# Patient Record
Sex: MALE | Race: WHITE | Employment: OTHER | URBAN - METROPOLITAN AREA
[De-identification: names, ages, dates, MRNs, and addresses within clinical notes are randomized per-mention and may not be internally consistent; named-entity substitution may affect disease eponyms.]

---

## 2022-09-23 ENCOUNTER — HOSPITAL ENCOUNTER (OUTPATIENT)
Dept: PHYSICAL THERAPY | Age: 57
Discharge: HOME | End: 2022-09-23
Payer: OTHER GOVERNMENT

## 2022-09-23 PROCEDURE — 97535 SELF CARE MNGMENT TRAINING: CPT | Performed by: PHYSICAL THERAPIST

## 2022-09-23 PROCEDURE — 97112 NEUROMUSCULAR REEDUCATION: CPT | Performed by: PHYSICAL THERAPIST

## 2022-09-23 PROCEDURE — 97110 THERAPEUTIC EXERCISES: CPT | Performed by: PHYSICAL THERAPIST

## 2022-09-23 PROCEDURE — 97140 MANUAL THERAPY 1/> REGIONS: CPT | Performed by: PHYSICAL THERAPIST

## 2022-09-23 PROCEDURE — 97530 THERAPEUTIC ACTIVITIES: CPT | Performed by: PHYSICAL THERAPIST

## 2022-09-23 PROCEDURE — 97162 PT EVAL MOD COMPLEX 30 MIN: CPT | Performed by: PHYSICAL THERAPIST

## 2022-09-23 NOTE — PROGRESS NOTES
PT DAILY TREATMENT NOTE     Patient Name: Sissy Tinajero  Date:2022  : 1965  [x]  Patient  Verified  Payor: MICHAEL / Plan: Kp [address] / Product Type:  /    In time:8:21A  Out time:9:10A  Total Treatment Time (min): 49min  Visit #: 1 of 18    Treatment Area: Other low back pain [M54.59]  Sacroiliitis (HCC) [M46.1]    SUBJECTIVE  Pain Level (0-10 scale): 1/10  Any medication changes, allergies to medications, adverse drug reactions, diagnosis change, or new procedure performed?: [x] No    [] Yes (see summary sheet for update)  Subjective functional status/changes:   [] No changes reported    Aggravating factors: sitting down/standing up, and bending over and standing up, walking more than 3/10ths of a mile. OBJECTIVE    15 min [x]Eval                  []Re-Eval       14 min Therapeutic Exercise:  [x] See flow sheet :   Rationale: increase ROM and increase strength to improve the patients ability to Tolerate basic ADLs and job-related tasks without pain. 12 min Neuromuscular Re-education:  [x]  See flow sheet :   Rationale: improve coordination, improve balance, and increase proprioception  to improve the patients ability to perform activities with good form and proprioception with tactile and verbal cuing appropriately. 8 min Manual Therapy:  grade 2-3 prone P/As to lumbar spine to S1 to L1   The manual therapy interventions were performed at a separate and distinct time from the therapeutic activities interventions. Rationale: decrease pain, increase ROM, and increase tissue extensibility to improve overall activity tolerance.     10 min Self Care/Home Management: Discussion of HEP, activity modification    Rationale: increase ROM and increase strength  to improve the patients ability to improve function in the home and at work          With   [x] TE   [x] TA   [x] neuro   [] other: Patient Education: [x] Review HEP    [x] Progressed/Changed HEP based on:   [x] positioning   [] body mechanics   [] transfers   [] heat/ice application    [] other:      Other Objective/Functional Measures:     Sit to Stands in 30 secs: 13 reps     SLR: - bilaterally   Slump Test: + on the Left    A/ROM  Hip flexion left to 85 degrees, Right to 95 degrees    Gaenslens Test: - bilaterally    Reflexes WNL bilaterally    MMT: 4-/5 throughout with positive quad lag    Repeated extension in prone: abolished pain  Repeated extension in standing: increased pain  Repeated flexion: peripheralized pain     Pain Level (0-10 scale) post treatment: 2/10    ASSESSMENT/Changes in Function: Patient is a pleasant older adult male with sub-acute onset of right hip/low back pain of insidious onset in the presence of chronic lack of exercise. 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 Treatment will include graded exposure to activity as well as pain science education. Patient will continue to benefit from skilled PT services to modify and progress therapeutic interventions, address functional mobility deficits, address ROM deficits, address strength deficits, analyze and address soft tissue restrictions, analyze and cue movement patterns, analyze and modify body mechanics/ergonomics, assess and modify postural abnormalities, address imbalance/dizziness, and instruct in home and community integration to attain remaining goals. [x]  See Plan of Care  []  See progress note/recertification  []  See Discharge Summary         Progress towards goals / Updated goals:  See POC    PLAN  [x]  Upgrade activities as tolerated     [x]  Continue plan of care  []  Update interventions per flow sheet       []  Discharge due to:_  []  Other:_      Nay Rubio, PT 9/23/2022  8:32 AM    No future appointments.

## 2022-09-23 NOTE — PROGRESS NOTES
[facility]  [address] [facility], [address] [place] [address], [address] [place] [address],[address]  [phone] fax      Plan of Care/ Statement of Necessity for Physical Therapy Services    Patient name: Jh Yost Start of Care: 2022   Referral source: Britney Ordaz NP : 1965    Medical Diagnosis: Other low back pain [M54.59]  Sacroiliitis ([address] [M46.1]  Payor:  / Plan: Fernando Anshu / Product Type:  /  Onset Date:22    Treatment Diagnosis: Left low back, hip and knee pain   Prior Hospitalization: see medical history Provider#: [id number]   Medications: Verified on Patient summary List    Comorbidities: Patient reports:    Prior Level of Function: I ADLs and 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 The Plan of Care and following information is based on the information from the initial evaluation. Assessment/ key information: Patient is a pleasant older adult male with sub-acute onset of right hip/low back pain of insidious onset in the presence of chronic lack of exercise. 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 Treatment will include graded exposure to activity as well as pain science education.    Evaluation Complexity History MEDIUM  Complexity : 1-2 comorbidities / personal factors will impact the outcome/ POC ; Examination MEDIUM Complexity : 3 Standardized tests and measures addressing body structure, function, activity limitation and / or participation in recreation  ;Presentation LOW Complexity : Stable, uncomplicated  ;Clinical Decision Making MEDIUM Complexity : FOTO score of 26-74  Overall Complexity Rating: MEDIUM  Problem List: pain affecting function, decrease ROM, decrease strength, edema affecting function, impaired gait/ balance, decrease ADL/ functional abilitiies, decrease activity tolerance, decrease flexibility/ joint mobility, and decrease transfer abilities   Treatment Plan may include any combination of the following: Therapeutic exercise, Therapeutic activities, Neuromuscular re-education, Physical agent/modality, Gait/balance training, Manual therapy, Patient education, Self Care training, Functional mobility training, Home safety training, Stair training, and Other: HEP  Patient / Family readiness to learn indicated by: asking questions, trying to perform skills, and interest  Persons(s) to be included in education: patient (P)  Barriers to Learning/Limitations: None  Patient Goal (s): I'd like to be able to walk better and get up and down a bit easier.   Patient Self Reported Health Status: good  Rehabilitation Potential: good    Short Term Goals: To be accomplished in 4 weeks:  1. Patient will be I with HEP for carryover to home management               Eval: established  2. Patient will be able to bend lift and carry 20# with good body mechanics and little to no difficulty to facilitate grocery management. Eval: 40# causes pain and is difficult once/week  Long Term Goals: To be accomplished in 6 weeks:  1. Patient will achieve predicted FOTO score of 68 for demonstration of improved function. Eval: 55  2. Patient will report ability to walk more than a mile with little to no difficulty to facilitate return to community recreation. Eval: 3/10ths of a mile  3. Patient will be able to bend lift and carry 50# with good body mechanics and little to no difficulty to facilitate grocery management. Eval: 40# causes pain and is difficult once/week    Frequency / Duration: Patient to be seen 2-3 times per week for 6 weeks.     Patient/ Caregiver education and instruction: Diagnosis, prognosis, self care, activity modification, and exercises   [x]  Plan of care has been reviewed with WOO Grant, PT 9/23/2022 8:33 AM  ________________________________________________________________________    I certify that the above Therapy Services are being furnished while the patient is under my care. I agree with the treatment plan and certify that this therapy is necessary.     [address] [place] [address] Signature:____________Date:_________TIME:________     Avani Melara NP  ** Signature, Date and Time must be completed for valid certification **      Please sign and return to In [address] [place] [address] [address]  [address] [place] [address], [address] [place] [address], [address] [facility] [address]  [phone] fax

## 2022-09-30 ENCOUNTER — HOSPITAL ENCOUNTER (OUTPATIENT)
Dept: PHYSICAL THERAPY | Age: 57
Discharge: HOME | End: 2022-09-30
Payer: OTHER GOVERNMENT

## 2022-09-30 PROCEDURE — 97110 THERAPEUTIC EXERCISES: CPT

## 2022-09-30 PROCEDURE — 97112 NEUROMUSCULAR REEDUCATION: CPT

## 2022-09-30 PROCEDURE — 97140 MANUAL THERAPY 1/> REGIONS: CPT

## 2022-09-30 PROCEDURE — 97530 THERAPEUTIC ACTIVITIES: CPT

## 2022-09-30 NOTE — PROGRESS NOTES
PT DAILY TREATMENT NOTE     Patient Name: Yvette Rae  Date:2022  : 1965  [x]  Patient  Verified  Payor: MICHAEL / Plan: Kp [address] / Product Type:  /    In time:9:47  Out time:10:48  Total Treatment Time (min): 61  Visit #: 2 of     Medicare/BCBS Only   Total Timed Codes (min):   1:1 Treatment Time:         Treatment Area: Other low back pain [M54.59]  Sacroiliitis (HCC) [M46.1]    SUBJECTIVE  Pain Level (0-10 scale): 2  Any medication changes, allergies to medications, adverse drug reactions, diagnosis change, or new procedure performed?: [x] No    [] Yes (see summary sheet for update)  Subjective functional status/changes:   [] No changes reported  \"The pain is on the left side of my back. \"    OBJECTIVE    Modality rationale: decrease pain and increase tissue extensibility to improve the patients ability to perform ADL    Min Type Additional Details    [] Estim:  []Unatt       []IFC  []Premod                        []Other:  []w/ice   []w/heat  Position:  Location:    [] Estim: []Att    []TENS instruct  []NMES                    []Other:  []w/US   []w/ice   []w/heat  Position:  Location:    []  Traction: [] Cervical       []Lumbar                       [] Prone          []Supine                       []Intermittent   []Continuous Lbs:  [] before manual  [] after manual    []  Ultrasound: []Continuous   [] Pulsed                           []1MHz   []3MHz W/cm2:  Location:    []  Iontophoresis with dexamethasone         Location: [] Take home patch   [] In clinic   10 [x]  Ice     []  heat  []  Ice massage  []  Laser   []  Anodyne Position:long sit  Location:(B) LSP    []  Laser with stim  []  Other:  Position:  Location:    []  Vasopneumatic Device    []  Right     []  Left  Pre-treatment girth:  Post-treatment girth:  Measured at (location):  Pressure:       [] lo [] med [] hi   Temperature: [] lo [] med [] hi   [x] Skin assessment post-treatment:  [x]intact []redness- no adverse reaction    []redness - adverse reaction:      min []Eval                  []Re-Eval       20 min Therapeutic Exercise:  [x] See flow sheet :   Rationale: increase ROM and increase strength to improve the patients ability to perform dailly chores    12 min Therapeutic Activity:  [x]  See flow sheet :   Rationale: increase ROM, increase strength, and improve coordination  to improve the patients ability to perform  sit to stand with no difficulty     8 min Neuromuscular Re-education:  [x]  See flow sheet :   Rationale: increase ROM, increase strength, improve coordination, improve balance, and increase proprioception  to improve the patients ability to     11 min Manual Therapy:  checked alignment:  right innominate posterior rotated P/A mob TSP /LSP passive  stretch (B) piriformis prone   The manual therapy interventions were performed at a separate and distinct time from the therapeutic activities interventions. Rationale: decrease pain, increase ROM, increase tissue extensibility, decrease trigger points, and increase postural awareness to perform household      min Gait Training:  ___ feet with ___ device on level surfaces with ___ level of assist   Rationale:     min Self Care/Home Management:    Rationale: increase ROM, increase strength, and improve coordination  to improve the patients ability to perform household chores          With   [x] TE   [x] TA   [] neuro   [] other: Patient Education: [x] Review HEP    [] Progressed/Changed HEP based on:   [] positioning   [] body mechanics   [] transfers   [] heat/ice application    [] other:      Other Objective/Functional Measures:  Right innominate posterior rotated     Pain Level (0-10 scale) post treatment: 0    ASSESSMENT/Changes in Function: Pt responded well to each supine and prone strengthening there ex with cues for correct form.  Pt presents with tightness at (B) LSP during manual. Therapist discussed with pt on importance of moving around during the day and to perform HEP to promote flexibility and to decrease pain at LSP. Pt verbally understood. Pt  benefited with treatment due to no pain. Pt is progressing towards all     Patient will continue to benefit from skilled PT services to modify and progress therapeutic interventions, address functional mobility deficits, address ROM deficits, address strength deficits, analyze and address soft tissue restrictions, analyze and cue movement patterns, analyze and modify body mechanics/ergonomics, assess and modify postural abnormalities, and instruct in home and community integration to attain remaining goals. [x]  See Plan of Care  []  See progress note/recertification  []  See Discharge Summary         Progress towards goals / Updated goals:  1. Patient will be I with HEP for carryover to home management               Eval: established               Current: 1x day   9/30/22  2. Patient will be able to bend lift and carry 20# with good body mechanics and little to no difficulty to facilitate grocery management. Eval: 40# causes pain and is difficult once/week  Long Term Goals: To be accomplished in 6 weeks:  1. Patient will achieve predicted FOTO score of 68 for demonstration of improved function. Eval: 55  2. Patient will report ability to walk more than a mile with little to no difficulty to facilitate return to community recreation. Eval: 3/10ths of a mile  3. Patient will be able to bend lift and carry 50# with good body mechanics and little to no difficulty to facilitate grocery management.                Eval: 40# causes pain and is difficult once/week    PLAN  []  Upgrade activities as tolerated     [x]  Continue plan of care  []  Update interventions per flow sheet       []  Discharge due to:_  []  Other:_      Mera Spaulding, WOO 9/30/2022  9:58 AM    Future Appointments   Date Time Provider Alisia Benítez   10/3/2022  9:45 AM Zack Rubio, PT [facility]   10/7/2022  9:45 AM Nirmal Shetty, PTA [facility]   10/10/2022 12:00 PM Nirmal Shetty, PTA [facility]   10/14/2022  1:30 PM Zoraida Harry, PT [facility]   10/17/2022  9:00 AM Nirmal Shetty, PTA [facility]   10/21/2022  9:00 AM Nirmal Shetty, PTA [facility]   10/24/2022  9:00 AM Nirmal Shetty, PTA [facility]   10/28/2022  9:00 AM Nirmal Shetty, PTA [facility]   10/31/2022  9:00 AM Nirmal Shetty, PTA [facility]

## 2022-10-03 ENCOUNTER — APPOINTMENT (OUTPATIENT)
Dept: PHYSICAL THERAPY | Age: 57
End: 2022-10-03
Payer: OTHER GOVERNMENT

## 2022-10-07 ENCOUNTER — HOSPITAL ENCOUNTER (OUTPATIENT)
Dept: PHYSICAL THERAPY | Age: 57
Discharge: HOME | End: 2022-10-07
Payer: OTHER GOVERNMENT

## 2022-10-07 PROCEDURE — 97140 MANUAL THERAPY 1/> REGIONS: CPT

## 2022-10-07 PROCEDURE — 97110 THERAPEUTIC EXERCISES: CPT

## 2022-10-07 PROCEDURE — 97535 SELF CARE MNGMENT TRAINING: CPT

## 2022-10-07 NOTE — PROGRESS NOTES
PT DAILY TREATMENT NOTE     Patient Name: Julia Arechiga  Date:10/7/2022  : 1965  [x]  Patient  Verified  Payor:  / Plan: Kp [address] / Product Type:  /    In time:948 am  Out time:1033 am  Total Treatment Time (min): 45  Visit #: 3 of 18    Treatment Area: Other low back pain [M54.59]  Sacroiliitis (HCC) [M46.1]    SUBJECTIVE  Pain Level (0-10 scale): 0/10   Any medication changes, allergies to medications, adverse drug reactions, diagnosis change, or new procedure performed?: [x] No    [] Yes (see summary sheet for update)  Subjective functional status/changes:   [] No changes reported  Patient reports having some pin point soreness on the left side of his low back. OBJECTIVE    Modality rationale: Patient declined modalities today.      Min Type Additional Details    [] Estim:  []Unatt       []IFC  []Premod                        []Other:  []w/ice   []w/heat  Position:  Location:    [] Estim: []Att    []TENS instruct  []NMES                    []Other:  []w/US   []w/ice   []w/heat  Position:  Location:    []  Traction: [] Cervical       []Lumbar                       [] Prone          []Supine                       []Intermittent   []Continuous Lbs:  [] before manual  [] after manual    []  Ultrasound: []Continuous   [] Pulsed                           []1MHz   []3MHz W/cm2:  Location:    []  Iontophoresis with dexamethasone         Location: [] Take home patch   [] In clinic    []  Ice     []  heat  []  Ice massage  []  Laser   []  Anodyne Position:  Location:    []  Laser with stim  []  Other:  Position:  Location:    []  Vasopneumatic Device    []  Right     []  Left  Pre-treatment girth:  Post-treatment girth:  Measured at (location):  Pressure:       [] lo [] med [] hi   Temperature: [] lo [] med [] hi   [] Skin assessment post-treatment:  []intact []redness- no adverse reaction []redness - adverse reaction:     22 min Therapeutic Exercise:  [] See flow sheet : Rationale: increase ROM and increase strength to improve the patients overall muscle endurance and activity tolerance for ADL's and functional tasks. min Therapeutic Activity:  []  See flow sheet :   Rationale: increase strength and improve coordination  to improve the patients ability to safely perform dynamic activities and to improve functional performance of  ADL's.      min Neuromuscular Re-education:  []  See flow sheet :   Rationale: increase strength, improve coordination, and improve balance  to improve the patients ability to perform activities with good form, stability and proprioception. 13 min Manual Therapy:  Shot gun mobs/MET; DTM and stretching (B) piriformis and glute med    The manual therapy interventions were performed at a separate and distinct time from the therapeutic activities interventions. Rationale: decrease pain, increase ROM, increase tissue extensibility, decrease trigger points, and increase postural awareness to assist with reducing SI joint dysfunction and performing ADL's with ease. 10 min Self Care/Home Management: Educated on proper sitting posture at work and to remove wallet from left back pocket when sitting. Rationale: increase strength, improve coordination, and improve balance  to improve the patients ability to perform work related tasks with ease. With   [] TE   [] TA   [] neuro   [] other: Patient Education: [x] Review HEP    [] Progressed/Changed HEP based on:   [] positioning   [] body mechanics   [] transfers   [] heat/ice application    [] other:      Other Objective/Functional Measures:   Left posteriorly rotated SI joint     Pain Level (0-10 scale) post treatment: 0/10     ASSESSMENT/Changes in Function:   Patient is progressing well towards goals. Patient performed exercises, as per flow sheet, to assist with increasing core and low back strength. SI joint dysfunction improved following manual therapy.  Patient responded well to today's session, as evident by, no increase in low back pain. Patient will continue to benefit from skilled PT services to modify and progress therapeutic interventions, address functional mobility deficits, address ROM deficits, address strength deficits, analyze and address soft tissue restrictions, analyze and cue movement patterns, analyze and modify body mechanics/ergonomics, assess and modify postural abnormalities, and instruct in home and community integration to attain remaining goals. []  See Plan of Care  []  See progress note/recertification  []  See Discharge Summary         Progress towards goals / Updated goals:  1. Patient will be I with HEP for carryover to home management               Eval: established               Current: 1x day   9/30/22  2. Patient will be able to bend lift and carry 20# with good body mechanics and little to no difficulty to facilitate grocery management. Eval: 40# causes pain and is difficult once/week  Long Term Goals: To be accomplished in 6 weeks:  1. Patient will achieve predicted FOTO score of 68 for demonstration of improved function. Eval: 55  2. Patient will report ability to walk more than a mile with little to no difficulty to facilitate return to community recreation. Eval: 3/10ths of a mile  3. Patient will be able to bend lift and carry 50# with good body mechanics and little to no difficulty to facilitate grocery management.                Eval: 40# causes pain and is difficult once/week    PLAN  [x]  Upgrade activities as tolerated     [x]  Continue plan of care  []  Update interventions per flow sheet       []  Discharge due to:_  []  Other:_      Namrata Rainey PTA 10/7/2022  10:25 AM    Future Appointments   Date Time Provider Alisia Benítez   10/10/2022 12:00 PM Jennifer Kelsey PTA [facility]   10/14/2022  1:30 PM Sarmad Stewart PT [facility]   10/17/2022  9:00 AM Jennifer Kelsey PTA [facility] 10/21/2022  9:00 AM Nirmal Shetty PTA [facility]   10/24/2022  9:00 AM Nirmal Shetty PTA [facility]   10/28/2022  9:00 AM Nirmal Shetty PTA [facility]   10/31/2022  9:00 AM Nirmal Shetty PTA [facility]

## 2022-10-10 ENCOUNTER — HOSPITAL ENCOUNTER (OUTPATIENT)
Dept: PHYSICAL THERAPY | Age: 57
Discharge: HOME | End: 2022-10-10
Payer: OTHER GOVERNMENT

## 2022-10-10 PROCEDURE — 97140 MANUAL THERAPY 1/> REGIONS: CPT

## 2022-10-10 PROCEDURE — 97112 NEUROMUSCULAR REEDUCATION: CPT

## 2022-10-10 PROCEDURE — 97110 THERAPEUTIC EXERCISES: CPT

## 2022-10-10 NOTE — PROGRESS NOTES
PT DAILY TREATMENT NOTE     Patient Name: Julia Arechiga  Date:10/10/2022  : 1965  [x]  Patient  Verified  Payor: MICHAEL / Plan: [employer] / Product Type:  /    In time: 1205 pm  Out time: 1250 pm   Total Treatment Time (min): 45  Visit #: 4 of 18    Treatment Area: Other low back pain [M54.59]  Sacroiliitis (HCC) [M46.1]    SUBJECTIVE  Pain Level (0-10 scale): 0/10   Any medication changes, allergies to medications, adverse drug reactions, diagnosis change, or new procedure performed?: [x] No    [] Yes (see summary sheet for update)  Subjective functional status/changes:   [] No changes reported  Patient states that he notices most of his discomfort when getting in and out of his smaller car. Patient explains that he has been trying to put his wallet in his side pocket instead of in his back pocket. OBJECTIVE    Modality rationale: Patient declined modalities today.      Min Type Additional Details    [] Estim:  []Unatt       []IFC  []Premod                        []Other:  []w/ice   []w/heat  Position:  Location:    [] Estim: []Att    []TENS instruct  []NMES                    []Other:  []w/US   []w/ice   []w/heat  Position:  Location:    []  Traction: [] Cervical       []Lumbar                       [] Prone          []Supine                       []Intermittent   []Continuous Lbs:  [] before manual  [] after manual    []  Ultrasound: []Continuous   [] Pulsed                           []1MHz   []3MHz W/cm2:  Location:    []  Iontophoresis with dexamethasone         Location: [] Take home patch   [] In clinic    []  Ice     []  heat  []  Ice massage  []  Laser   []  Anodyne Position:  Location:    []  Laser with stim  []  Other:  Position:  Location:    []  Vasopneumatic Device    []  Right     []  Left  Pre-treatment girth:  Post-treatment girth:  Measured at (location):  Pressure:       [] lo [] med [] hi   Temperature: [] lo [] med [] hi   [] Skin assessment post-treatment:  []intact []redness- no adverse reaction []redness - adverse reaction:     22 min Therapeutic Exercise:  [] See flow sheet :   Rationale: increase ROM and increase strength to improve the patients overall muscle endurance and activity tolerance for ADL's and functional tasks. min Therapeutic Activity:  []  See flow sheet :   Rationale: increase strength and improve coordination  to improve the patients ability to safely perform dynamic activities and to improve functional performance of  ADL's.     13 min Neuromuscular Re-education:  []  See flow sheet :   Rationale: increase strength, improve coordination, and improve balance  to improve the patients ability to perform activities with good form, stability and proprioception. 10 min Manual Therapy:  Shot gun mobs/MET; DTM and stretching (B) piriformis and glute med    The manual therapy interventions were performed at a separate and distinct time from the therapeutic activities interventions. Rationale: decrease pain, increase ROM, increase tissue extensibility, decrease trigger points, and increase postural awareness to assist with reducing SI joint dysfunction and performing ADL's with ease. min Self Care/Home Management:    Rationale: increase strength, improve coordination, and improve balance  to improve the patients ability to perform work related tasks with ease. With   [] TE   [] TA   [] neuro   [] other: Patient Education: [x] Review HEP    [] Progressed/Changed HEP based on:   [] positioning   [] body mechanics   [] transfers   [] heat/ice application    [] other:      Other Objective/Functional Measures:   Left posteriorly rotated SI joint     Pain Level (0-10 scale) post treatment: 0/10     ASSESSMENT/Changes in Function:   Patient is progressing well towards goals. Progressed exercises, as per flow sheet, to assist with increasing core strength. Verbal cues were required for today's progressed exercises. Patient continues to present with SI joint dysfunction. Patient responded well to today's session, as evident by, no increase in low back pain and reduced SI joint dysfunction. Patient will continue to benefit from skilled PT services to modify and progress therapeutic interventions, address functional mobility deficits, address ROM deficits, address strength deficits, analyze and address soft tissue restrictions, analyze and cue movement patterns, analyze and modify body mechanics/ergonomics, assess and modify postural abnormalities, and instruct in home and community integration to attain remaining goals. []  See Plan of Care  []  See progress note/recertification  []  See Discharge Summary         Progress towards goals / Updated goals:  1. Patient will be I with HEP for carryover to home management               Eval: established               Current: 1x day   9/30/22  2. Patient will be able to bend lift and carry 20# with good body mechanics and little to no difficulty to facilitate grocery management. Eval: 40# causes pain and is difficult once/week  Long Term Goals: To be accomplished in 6 weeks:  1. Patient will achieve predicted FOTO score of 68 for demonstration of improved function. Eval: 55  2. Patient will report ability to walk more than a mile with little to no difficulty to facilitate return to community recreation. Eval: 3/10ths of a mile  3. Patient will be able to bend lift and carry 50# with good body mechanics and little to no difficulty to facilitate grocery management.                Eval: 40# causes pain and is difficult once/week    PLAN  [x]  Upgrade activities as tolerated     [x]  Continue plan of care  []  Update interventions per flow sheet       []  Discharge due to:_  []  Other:_      Royce Cooks, PTA 10/10/2022  10:25 AM    Future Appointments   Date Time Provider Alisia Benítez   10/14/2022  1:30 PM [facility]   10/17/2022  9:00 AM Angella Camera, PTA [facility]   10/21/2022  9:00 AM Angella Camera, PTA [facility]   10/24/2022  9:00 AM Angella Camera, PTA [facility]   10/28/2022  9:00 AM Angella Camera, PTA [facility]   10/31/2022  9:00 AM Angella Camera, PTA [facility]

## 2022-10-14 ENCOUNTER — HOSPITAL ENCOUNTER (OUTPATIENT)
Dept: PHYSICAL THERAPY | Age: 57
Discharge: HOME | End: 2022-10-14
Payer: OTHER GOVERNMENT

## 2022-10-14 PROCEDURE — 97140 MANUAL THERAPY 1/> REGIONS: CPT

## 2022-10-14 PROCEDURE — 97032 APPL MODALITY 1+ESTIM EA 15: CPT

## 2022-10-14 PROCEDURE — 97112 NEUROMUSCULAR REEDUCATION: CPT

## 2022-10-14 PROCEDURE — 97110 THERAPEUTIC EXERCISES: CPT

## 2022-10-14 NOTE — PROGRESS NOTES
PT DAILY TREATMENT NOTE     Patient Name: Nnamdi Padilla  Date:10/14/2022  : 1965  [x]  Patient  Verified  Payor: [facility] / Plan: [facility] / Product Type:  /    In time:132  Out time:225  Total Treatment Time (min): 53  Visit #: 5 of 18       Treatment Area: Other low back pain [M54.59]  Sacroiliitis (HCC) [M46.1]    SUBJECTIVE  Pain Level (0-10 scale): 2  Any medication changes, allergies to medications, adverse drug reactions, diagnosis change, or new procedure performed?: [x] No    [] Yes (see summary sheet for update)  Subjective functional status/changes:   [] No changes reported  Reports he is more sore today. Reports bruising in area he had manual therapy last session (left ham) and this is the location of his pain today.      OBJECTIVE    Modality rationale: decrease inflammation, decrease pain, and increase tissue extensibility to improve the patients ability to tolerate ADLs and activities   Min Type Additional Details    [] Estim:  []Unatt       []IFC  []Premod                        []Other:  []w/ice   []w/heat  Position:  Location:   8 [x] Estim: [x]Att    []TENS instruct  []NMES                    [x]Other: LTO []w/US   []w/ice   []w/heat  Position:prone  Location:left SIJ    []  Traction: [] Cervical       []Lumbar                       [] Prone          []Supine                       []Intermittent   []Continuous Lbs:  [] before manual  [] after manual    []  Ultrasound: []Continuous   [] Pulsed                           []1MHz   []3MHz W/cm2:  Location:    []  Iontophoresis with dexamethasone         Location: [] Take home patch   [] In clinic   10 []  Ice     [x]  Heat prone  []  Ice massage  []  Laser   []  Anodyne Position:prone  Location:B LS    []  Laser with stim  []  Other:  Position:  Location:    []  Vasopneumatic Device    []  Right     []  Left  Pre-treatment girth:  Post-treatment girth:  Measured at (location):  Pressure:       [] lo [] med [] hi Temperature: [] lo [] med [] hi   [] Skin assessment post-treatment:  []intact []redness- no adverse reaction    []redness - adverse reaction:          14 min Therapeutic Exercise:  [x] See flow sheet :   Rationale: increase ROM, increase strength, and improve coordination to improve the patients ability to tolerate ADLS and activities        13 min Neuromuscular Re-education:  []  See flow sheet :   Rationale: increase ROM, increase strength, and improve coordination  to improve the patients ability to tolerate ADLS and activities    8  min Manual Therapy:  alignment check   The manual therapy interventions were performed at a separate and distinct time from the therapeutic activities interventions. Rationale: decrease pain, increase ROM, and increase tissue extensibility to tolerate ADLS and activities        With   [x] TE   [] TA   [x] neuro   [] other: Patient Education: [x] Review HEP    [x] Progressed/Changed HEP based on:   [] positioning   [] body mechanics   [] transfers   [] heat/ice application    [] other:      Other Objective/Functional Measures: VC exercises and technique  Added toe touches and high knees 10X each     Pain Level (0-10 scale) post treatment: 0  \"feels pretty good. \"    ASSESSMENT/Changes in Function: reports increased pain/soreness and bruising post manual last session. Tolerated well today with alignment check showing no LLD. Note CCO pain left SIJ. Trial LTO today and will monitor for response. Introduced high knee marches for SIJ mobility and toe touches for flexion ROM . No difficulty noted with progression today.      Patient will continue to benefit from skilled PT services to modify and progress therapeutic interventions, address ROM deficits, address strength deficits, analyze and address soft tissue restrictions, analyze and cue movement patterns, analyze and modify body mechanics/ergonomics, assess and modify postural abnormalities, and instruct in home and community integration to attain remaining goals. [x]  See Plan of Care  []  See progress note/recertification  []  See Discharge Summary         Progress towards goals / Updated goals:  Short Term Goals: To be accomplished in 4 weeks  1. Patient will be I with HEP for carryover to home management               Eval: established               Current: 1x day   10/14/22  2. Patient will be able to bend lift and carry 20# with good body mechanics and little to no difficulty to facilitate grocery management. Eval: 40# causes pain and is difficult once/week  CURRENT ongoing NA 10/14/22  Long Term Goals: To be accomplished in 6 weeks:  1. Patient will achieve predicted FOTO score of 68 for demonstration of improved function. Eval: 55  CURRENT Ongoing NA 10/14/22  2. Patient will report ability to walk more than a mile with little to no difficulty to facilitate return to community recreation. Eval: 3/10ths of a mile  CURRENT ongoing NA   TM . 14 miles in 5 minutes 10/14/22  3. Patient will be able to bend lift and carry 50# with good body mechanics and little to no difficulty to facilitate grocery management.                Eval: 40# causes pain and is difficult once/week  CURRENT ongoing NA 10/14/22    PLAN  [x]  Upgrade activities as tolerated     [x]  Continue plan of care  []  Update interventions per flow sheet       []  Discharge due to:_  []  Other:_      Ankur Lou, PT 10/14/2022  1:36 PM    Future Appointments   Date Time Provider Alisia Benítez   10/17/2022  9:00 AM Marty Ayala, PTA [facility]   10/21/2022  9:00 AM Marty Ayala, PTA [facility]   10/24/2022  9:00 AM [facility]ala[facility]   10/28/2022  9:00 AM Marty Ayala, PTA [facility]   10/31/2022  9:00 AM Marty Ayala, PTA [facility]

## 2022-10-17 ENCOUNTER — HOSPITAL ENCOUNTER (OUTPATIENT)
Dept: PHYSICAL THERAPY | Age: 57
Discharge: HOME | End: 2022-10-17
Payer: OTHER GOVERNMENT

## 2022-10-17 PROCEDURE — 97110 THERAPEUTIC EXERCISES: CPT

## 2022-10-17 PROCEDURE — 97530 THERAPEUTIC ACTIVITIES: CPT

## 2022-10-17 PROCEDURE — 97112 NEUROMUSCULAR REEDUCATION: CPT

## 2022-10-17 NOTE — PROGRESS NOTES
PT DAILY TREATMENT NOTE     Patient Name: Ever Solano  Date:10/17/2022  : 1965  [x]  Patient  Verified  Payor:  / Plan: [facility] / Product Type:  /    In time: 764  Out time:940 am  Total Treatment Time (min): 38  Visit #: 6 of 18    Treatment Area: Other low back pain [M54.59]  Sacroiliitis (HCC) [M46.1]    SUBJECTIVE  Pain Level (0-10 scale): 0/10   Any medication changes, allergies to medications, adverse drug reactions, diagnosis change, or new procedure performed?: [x] No    [] Yes (see summary sheet for update)  Subjective functional status/changes:   [] No changes reported  Patient reports some soreness in his low back. Patient explains that he has been putting his wallet in his side pocket and he can see that it is making a difference. \"I wanted to cover the pool this weekend but did not want to hurt mu back because it's pretty heavy. \"    OBJECTIVE    Modality rationale: Patient declined modalities today.      Min Type Additional Details    [] Estim:  []Unatt       []IFC  []Premod                        []Other:  []w/ice   []w/heat  Position:  Location:    [] Estim: []Att    []TENS instruct  []NMES                    []Other:  []w/US   []w/ice   []w/heat  Position:  Location:    []  Traction: [] Cervical       []Lumbar                       [] Prone          []Supine                       []Intermittent   []Continuous Lbs:  [] before manual  [] after manual    []  Ultrasound: []Continuous   [] Pulsed                           []1MHz   []3MHz W/cm2:  Location:    []  Iontophoresis with dexamethasone         Location: [] Take home patch   [] In clinic    []  Ice     []  heat  []  Ice massage  []  Laser   []  Anodyne Position:  Location:    []  Laser with stim  []  Other:  Position:  Location:    []  Vasopneumatic Device    []  Right     []  Left  Pre-treatment girth:  Post-treatment girth:  Measured at (location):  Pressure:       [] lo [] med [] hi   Temperature: [] lo [] med [] hi   [] Skin assessment post-treatment:  []intact []redness- no adverse reaction []redness - adverse reaction:     16 min Therapeutic Exercise:  [] See flow sheet :   Rationale: increase ROM and increase strength to improve the patients overall muscle endurance and activity tolerance for ADL's and functional tasks. 10 min Therapeutic Activity:  []  See flow sheet :   Rationale: increase strength and improve coordination  to improve the patients ability to safely perform dynamic activities and to improve functional performance of  ADL's.     12 min Neuromuscular Re-education:  []  See flow sheet :   Rationale: increase strength, improve coordination, and improve balance  to improve the patients ability to perform activities with good form, stability and proprioception. min Manual Therapy:  Shot gun mobs/MET   The manual therapy interventions were performed at a separate and distinct time from the therapeutic activities interventions. Rationale: decrease pain, increase ROM, increase tissue extensibility, decrease trigger points, and increase postural awareness to assist with reducing SI joint dysfunction and performing ADL's with ease. min Self Care/Home Management:    Rationale: increase strength, improve coordination, and improve balance  to improve the patients ability to perform work related tasks with ease. With   [] TE   [] TA   [] neuro   [] other: Patient Education: [x] Review HEP    [] Progressed/Changed HEP based on:   [] positioning   [] body mechanics   [] transfers   [] heat/ice application    [] other:      Other Objective/Functional Measures: Added lateral stepping with OTB    Symmetrical SI joint     Pain Level (0-10 scale) post treatment: 0/10     ASSESSMENT/Changes in Function:   Patient is progressing well towards goals. Progressed exercises, as per flow sheet, to assist with increasing low back strength.  Increased muscle fatigue to was experienced with today's progress exercises. SI joint dysfunction improved today and no manual therapy was required. Patient responded well to today's session, as evident by, improved exercise tolerance and no increase in low back pain. Patient will continue to benefit from skilled PT services to modify and progress therapeutic interventions, address functional mobility deficits, address ROM deficits, address strength deficits, analyze and address soft tissue restrictions, analyze and cue movement patterns, analyze and modify body mechanics/ergonomics, assess and modify postural abnormalities, and instruct in home and community integration to attain remaining goals. []  See Plan of Care  []  See progress note/recertification  []  See Discharge Summary         Progress towards goals / Updated goals:  Short Term Goals: To be accomplished in 4 weeks  1. Patient will be I with HEP for carryover to home management               Eval: established               Current: 1x day   10/17/22  2. Patient will be able to bend lift and carry 20# with good body mechanics and little to no difficulty to facilitate grocery management. Eval: 40# causes pain and is difficult once/week  CURRENT ongoing NA 10/17/22  Long Term Goals: To be accomplished in 6 weeks:  1. Patient will achieve predicted FOTO score of 68 for demonstration of improved function. Eval: 55  CURRENT Ongoing NA 10/17/22  2. Patient will report ability to walk more than a mile with little to no difficulty to facilitate return to community recreation. Eval: 3/10ths of a mile  CURRENT- TM . 14 miles in 5 minutes 10/17/22  3. Patient will be able to bend lift and carry 50# with good body mechanics and little to no difficulty to facilitate grocery management.                Eval: 40# causes pain and is difficult once/week  CURRENT ongoing NA 10/17/22    PLAN  [x]  Upgrade activities as tolerated     [x]  Continue plan of care  []  Update interventions per flow sheet       []  Discharge due to:_  []  Other:_      Cindy Zabala, WOO 10/17/2022  10:25 AM    Future Appointments   Date Time Provider Alisia Benítez   10/21/2022  9:00 AM Bernardo Jean PTA [facility]   10/24/2022  9:00 AM Bernardo Jean PTA [facility]   10/28/2022  9:00 AM Bernardo Jean PTA [facility]   10/31/2022  9:00 AM Bernardo Jean PTA [facility]

## 2022-10-21 ENCOUNTER — HOSPITAL ENCOUNTER (OUTPATIENT)
Dept: PHYSICAL THERAPY | Age: 57
Discharge: HOME | End: 2022-10-21
Payer: OTHER GOVERNMENT

## 2022-10-21 PROCEDURE — 97110 THERAPEUTIC EXERCISES: CPT

## 2022-10-21 PROCEDURE — 97530 THERAPEUTIC ACTIVITIES: CPT

## 2022-10-21 NOTE — PROGRESS NOTES
[facility] [address] [facility]  [address] [facility], [facility], [facility] [address],[place] [address]  [phone] fax    Physician Update  [x] Progress Note  [] Discharge Summary  Patient name: Zoila Herrera Start of Care: 2022   Referral source: Yehuda Varner NP : 1965                Medical Diagnosis: Other low back pain [M54.59]  Sacroiliitis ([address] [M46.1]  Payor:  / Plan: [address] / Product Type:  /  Onset Date:22                Treatment Diagnosis: Left low back, hip and knee pain   Prior Hospitalization: see medical history Provider#: [id number]   Medications: Verified on Patient summary List    Comorbidities: Patient reports:    Prior Level of Function: I ADLs and 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 Visits from Start of Care: 7 Missed Visits: 1    Status at Evaluation/Last Progress Note:  Patient is a pleasant older adult male with sub-acute onset of right hip/low back pain of insidious onset in the presence of chronic lack of exercise. 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 Treatment will include graded exposure to activity as well as pain science education. Progress towards Goals:  Functional Gains: Getting in and out of the car, standing from a seated position and intensity of pain. Functional Deficits: Poor posture, bending over to  items. % improvement: 30%  Pain   Average: 3/10                  Best: 0/10                Worst: 7/10  Patient Goal: \"To be pain free and get back to where I was before. \"     Short Term Goals: To be accomplished in 4 weeks  1. Patient will be I with HEP for carryover to home management               Eval: established               Current: Patient reports compliance 1-2 times a day. MET   2. Patient will be able to bend lift and carry 20# with good body mechanics and little to no difficulty to facilitate grocery management. Eval: 40# causes pain and is difficult once/week  CURRENT: Patient is able lift and carry 20# with no increase in low back pain. MET   Long Term Goals: To be accomplished in 6 weeks:  1. Patient will achieve predicted FOTO score of 68 for demonstration of improved function. Eval: 56  CURRENT- 57 points. Progressing   2. Patient will report ability to walk more than a mile with little to no difficulty to facilitate return to community recreation. Eval: 3/10ths of a mile  CURRENT- Patient reports that he can walk 3 tenths of a mile before having increased difficulty from osteoarthritis in his knees. Progressing  3. Patient will be able to bend lift and carry 50# with good body mechanics and little to no difficulty to facilitate grocery management. Eval: 40# causes pain and is difficult once/week  CURRENT: Patient is able to bend and lift 50# but experiences increased low back pain. Progressing    Goals: to be achieved in 4 weeks:  1. Patient will achieve predicted FOTO score of 68 for demonstration of improved function. Eval: 56  PN: 57 points. 2. Patient will report ability to walk more than a mile with little to no difficulty to facilitate return to community recreation. PN: Patient reports that he can walk 3 tenths of a mile before having increased difficulty from osteoarthritis in his knees and increased low back pain. 3. Patient will be able to bend lift and carry 50# with good body mechanics and little to no difficulty to facilitate grocery management. PN: Patient is able to bend and lift 50# but experiences increased low back pain. ASSESSMENT/RECOMMENDATIONS:  Patient has attended 7 PT visits and is progressing well. Patient's overall low back pain has improved along with his FOTO assessment score. Patient refers to his low back pain as situational and occurs when bending over and when getting in and out of the car. Patient continues to present to PT with decreased core strength and SI joint dysfunction. Patient reports 30% improvement with PT and would like to continue in order to return to PLOF. Patient will continue to benefit from skilled PT services to modify and progress therapeutic interventions, address functional mobility deficits, address ROM deficits, address strength deficits, analyze and address soft tissue restrictions, analyze and cue movement patterns, analyze and modify body mechanics/ergonomics, assess and modify postural abnormalities, and instruct in home and community integration to attain remaining goals. [x]Continue therapy per initial plan/protocol at a frequency of  2 x per week for 4 weeks  []Continue therapy with the following recommended changes:_____________________      _____________________________________________________________________  []Discontinue therapy progressing towards or have reached established goals  []Discontinue therapy due to lack of appreciable progress towards goals  []Discontinue therapy due to lack of attendance or compliance  []Await Physician's recommendations/decisions regarding therapy  []Other:________________________________________________________________    Thank you for this referral. Toño Cunningham, WOO 10/21/2022 8:57 AM  NOTE TO PHYSICIAN:  PLEASE COMPLETE THE ORDERS BELOW AND   FAX TO [facility]: [phone]  If you are unable to process this request in 24 hours please contact our office: [phone]    I have read the above report and request that my patient continue as recommended.   I have read the above report and request that my patient continue therapy with the following changes/special instructions:_____________________________________  I have read the above report and request that my patient be discharged from therapy.     [de-identified] Signature:____________Date:_________TIME:________     May Lakeshia NP  ** Signature, Date and Time must be completed for valid certification **

## 2022-10-21 NOTE — PROGRESS NOTES
PT DAILY TREATMENT NOTE     Patient Name: Sugey San  Date:10/21/2022  : 1965  [x]  Patient  Verified  Payor:  / Plan: [facility] / Product Type:  /    In time: 900 am  Out time:945 am  Total Treatment Time (min): 45  Visit #: 7 of 18    Treatment Area: Other low back pain [M54.59]  Sacroiliitis (HCC) [M46.1]    SUBJECTIVE  Pain Level (0-10 scale): 0/10   Any medication changes, allergies to medications, adverse drug reactions, diagnosis change, or new procedure performed?: [x] No    [] Yes (see summary sheet for update)  Subjective functional status/changes:   [] No changes reported  Patient reports that his back is better and the pain is not as intense as before. Patient denies any pain today. OBJECTIVE    Modality rationale: Patient declined modalities today.      Min Type Additional Details    [] Estim:  []Unatt       []IFC  []Premod                        []Other:  []w/ice   []w/heat  Position:  Location:    [] Estim: []Att    []TENS instruct  []NMES                    []Other:  []w/US   []w/ice   []w/heat  Position:  Location:    []  Traction: [] Cervical       []Lumbar                       [] Prone          []Supine                       []Intermittent   []Continuous Lbs:  [] before manual  [] after manual    []  Ultrasound: []Continuous   [] Pulsed                           []1MHz   []3MHz W/cm2:  Location:    []  Iontophoresis with dexamethasone         Location: [] Take home patch   [] In clinic    []  Ice     []  heat  []  Ice massage  []  Laser   []  Anodyne Position:  Location:    []  Laser with stim  []  Other:  Position:  Location:    []  Vasopneumatic Device    []  Right     []  Left  Pre-treatment girth:  Post-treatment girth:  Measured at (location):  Pressure:       [] lo [] med [] hi   Temperature: [] lo [] med [] hi   [] Skin assessment post-treatment:  []intact []redness- no adverse reaction []redness - adverse reaction:     10 min Therapeutic Exercise:  [] See flow sheet :   Rationale: increase ROM and increase strength to improve the patients overall muscle endurance and activity tolerance for ADL's and functional tasks. 35 min Therapeutic Activity:  []  See flow sheet :   Rationale: increase strength and improve coordination  to improve the patients ability to safely perform dynamic activities and to improve functional performance of  ADL's.      min Neuromuscular Re-education:  []  See flow sheet :   Rationale: increase strength, improve coordination, and improve balance  to improve the patients ability to perform activities with good form, stability and proprioception. min Manual Therapy:  Shot gun mobs/MET   The manual therapy interventions were performed at a separate and distinct time from the therapeutic activities interventions. Rationale: decrease pain, increase ROM, increase tissue extensibility, decrease trigger points, and increase postural awareness to assist with reducing SI joint dysfunction and performing ADL's with ease. min Self Care/Home Management:    Rationale: increase strength, improve coordination, and improve balance  to improve the patients ability to perform work related tasks with ease. With   [] TE   [] TA   [] neuro   [] other: Patient Education: [x] Review HEP    [] Progressed/Changed HEP based on:   [] positioning   [] body mechanics   [] transfers   [] heat/ice application    [] other:      Other Objective/Functional Measures:   Functional Gains: Getting in and out of the car, standing from a seated position and intensity of pain. Functional Deficits: Poor posture, bending over to  items. % improvement: 30%  Pain   Average: 3/10       Best: 0/10     Worst: 7/10  Patient Goal: \"To be pain free and get back to where I was before. \"   Pain Level (0-10 scale) post treatment: 0/10     ASSESSMENT/Changes in Function:   Patient has attended 7 PT visits and is progressing well.  Patient's overall low back pain has improved along with his FOTO assessment score. Patient refers to his low back pain as situational and occurs when bending over and when getting in and out of the car. Patient continues to present to PT with decreased core strength and SI joint dysfunction. Patient reports 30% improvement with PT and would like to continue in order to return to PLOF. Patient will continue to benefit from skilled PT services to modify and progress therapeutic interventions, address functional mobility deficits, address ROM deficits, address strength deficits, analyze and address soft tissue restrictions, analyze and cue movement patterns, analyze and modify body mechanics/ergonomics, assess and modify postural abnormalities, and instruct in home and community integration to attain remaining goals. []  See Plan of Care  [x]  See progress note/recertification  []  See Discharge Summary         Progress towards goals / Updated goals:  Short Term Goals: To be accomplished in 4 weeks  1. Patient will be I with HEP for carryover to home management               Eval: established               Current: Patient reports compliance 1-2 times a day. MET   2. Patient will be able to bend lift and carry 20# with good body mechanics and little to no difficulty to facilitate grocery management. Eval: 40# causes pain and is difficult once/week  CURRENT: Patient is able lift and carry 20# with no increase in low back pain. MET   Long Term Goals: To be accomplished in 6 weeks:  1. Patient will achieve predicted FOTO score of 68 for demonstration of improved function. Eval: 56  CURRENT- 57 points. Progressing   2. Patient will report ability to walk more than a mile with little to no difficulty to facilitate return to community recreation.                Eval: 3/10ths of a mile  CURRENT- Patient reports that he can walk 3 tenths of a mile before having increased difficulty from osteoarthritis in his knees. Progressing  3. Patient will be able to bend lift and carry 50# with good body mechanics and little to no difficulty to facilitate grocery management. Eval: 40# causes pain and is difficult once/week  CURRENT: Patient is able to bend and lift 50# but experiences increased low back pain.  Progressing    PLAN  [x]  Upgrade activities as tolerated     [x]  Continue plan of care  []  Update interventions per flow sheet       []  Discharge due to:_  []  Other:_      Christ Akers PTA 10/21/2022  10:25 AM    Future Appointments   Date Time Provider Alisia Benítez   10/24/2022  9:00 AM Holly Mcmillan PTA [facility]   10/28/2022  9:00 AM Holly Mcmillan PTA [facility]   10/31/2022  9:00 AM Holly Mcmillan PTA [facility]

## 2022-10-24 ENCOUNTER — HOSPITAL ENCOUNTER (OUTPATIENT)
Dept: PHYSICAL THERAPY | Age: 57
Discharge: HOME | End: 2022-10-24
Payer: OTHER GOVERNMENT

## 2022-10-24 PROCEDURE — 97112 NEUROMUSCULAR REEDUCATION: CPT | Performed by: PHYSICAL THERAPIST

## 2022-10-24 PROCEDURE — 97110 THERAPEUTIC EXERCISES: CPT | Performed by: PHYSICAL THERAPIST

## 2022-10-24 PROCEDURE — 97535 SELF CARE MNGMENT TRAINING: CPT | Performed by: PHYSICAL THERAPIST

## 2022-10-24 PROCEDURE — 97140 MANUAL THERAPY 1/> REGIONS: CPT | Performed by: PHYSICAL THERAPIST

## 2022-10-24 PROCEDURE — 97530 THERAPEUTIC ACTIVITIES: CPT | Performed by: PHYSICAL THERAPIST

## 2022-10-28 ENCOUNTER — HOSPITAL ENCOUNTER (OUTPATIENT)
Dept: PHYSICAL THERAPY | Age: 57
Discharge: HOME | End: 2022-10-28
Payer: OTHER GOVERNMENT

## 2022-10-28 PROCEDURE — 97110 THERAPEUTIC EXERCISES: CPT

## 2022-10-28 PROCEDURE — 97112 NEUROMUSCULAR REEDUCATION: CPT

## 2022-10-28 PROCEDURE — 97014 ELECTRIC STIMULATION THERAPY: CPT

## 2022-10-28 PROCEDURE — 97140 MANUAL THERAPY 1/> REGIONS: CPT

## 2022-10-28 NOTE — PROGRESS NOTES
PT DAILY TREATMENT NOTE     Patient Name: Talisha Young  Date:10/28/2022  : 1965  [x]  Patient  Verified  Payor:  / Plan: [facility]ION / Product Type:  /    In time: 905 am  Out time: 1010 am  Total Treatment Time (min): 65  Visit #: 2 of 8    Treatment Area: Other low back pain [M54.59]  Sacroiliitis (HCC) [M46.1]    SUBJECTIVE  Pain Level (0-10 scale): 0/10   Any medication changes, allergies to medications, adverse drug reactions, diagnosis change, or new procedure performed?: [x] No    [] Yes (see summary sheet for update)  Subjective functional status/changes:   [] No changes reported  Patient states that he had 6-7/10 pain last night when picking up his cat to eat. Patient reports that the pain he had last night has subsided. OBJECTIVE    Modality rationale: decrease pain and increase tissue extensibility to improve the patients ability to assist with performing ADL's and functional tasks without limitations.     Min Type Additional Details   10 [x] Estim:  [x]Unatt       []IFC  []Premod                        []Other:  []w/ice   [x]w/heat  Position:prone  Location:L/S    [] Estim: []Att    []TENS instruct  []NMES                    []Other:  []w/US   []w/ice   []w/heat  Position:  Location:    []  Traction: [] Cervical       []Lumbar                       [] Prone          []Supine                       []Intermittent   []Continuous Lbs:  [] before manual  [] after manual    []  Ultrasound: []Continuous   [] Pulsed                           []1MHz   []3MHz W/cm2:  Location:    []  Iontophoresis with dexamethasone         Location: [] Take home patch   [] In clinic    []  Ice     []  heat  []  Ice massage  []  Laser   []  Anodyne Position:  Location:    []  Laser with stim  []  Other:  Position:  Location:    []  Vasopneumatic Device    []  Right     []  Left  Pre-treatment girth:  Post-treatment girth:  Measured at (location):  Pressure:       [] lo [] med [] hi Temperature: [] lo [] med [] hi   [] Skin assessment post-treatment:  []intact []redness- no adverse reaction []redness - adverse reaction:     32 min Therapeutic Exercise:  [] See flow sheet :   Rationale: increase ROM and increase strength to improve the patients overall muscle endurance and activity tolerance for ADL's and functional tasks. min Therapeutic Activity:  []  See flow sheet :   Rationale: increase strength and improve coordination  to improve the patients ability to safely perform dynamic activities and to improve functional performance of  ADL's. 15 min Neuromuscular Re-education:  []  See flow sheet :   Rationale: increase strength, improve coordination, and improve balance  to improve the patients ability to perform activities with good form, stability and proprioception. 8 min Manual Therapy: DTM to left SI joint, QL and paraspinals   The manual therapy interventions were performed at a separate and distinct time from the therapeutic activities interventions. Rationale: decrease pain, increase ROM, increase tissue extensibility, decrease trigger points, and increase postural awareness to assist with reducing SI joint dysfunction and performing ADL's with ease. min Self Care/Home Management:    Rationale: increase strength, improve coordination, and improve balance  to improve the patients ability to perform work related tasks with ease. With   [] TE   [] TA   [] neuro   [] other: Patient Education: [x] Review HEP    [] Progressed/Changed HEP based on:   [] positioning   [] body mechanics   [] transfers   [] heat/ice application    [] other:      Other Objective/Functional Measures:    Added hip 3 way with 2#; (B) shoulder ext and pall of press at [address] lateral stepping to GTB    Muscle spasms and TTP present at left QL and SI joint     Pain Level (0-10 scale) post treatment: 0/10     ASSESSMENT/Changes in Function:   Patient is progressing as expected towards goals. Progressed exercises, as per flow sheet, to assist with increasing core and low back strength. Patient was challenged with today's progressed exercises and experienced increased (B) hip muscle fatigue. Muscle spasms and TTP decreased following manual therapy. Patient responded well to today's session, as evident by, no increase in low back pain and improved exercise tolerance. Patient will continue to benefit from skilled PT services to modify and progress therapeutic interventions, address functional mobility deficits, address ROM deficits, address strength deficits, analyze and address soft tissue restrictions, analyze and cue movement patterns, analyze and modify body mechanics/ergonomics, assess and modify postural abnormalities, and instruct in home and community integration to attain remaining goals. []  See Plan of Care  []  See progress note/recertification  []  See Discharge Summary         Progress towards goals / Updated goals:  1. Patient will achieve predicted FOTO score of 68 for demonstration of improved function. PN: 57 points. 2. Patient will report ability to walk more than a mile with little to no difficulty to facilitate return to community recreation. PN: Patient reports that he can walk 3 tenths of a mile before having increased difficulty from osteoarthritis in his knees and increased low back pain. 3. Patient will be able to bend lift and carry 50# with good body mechanics and little to no difficulty to facilitate grocery management. PN: Patient is able to bend and lift 50# but experiences increased low back pain.    Current: to assess next visit 10/24/22    PLAN  [x]  Upgrade activities as tolerated     [x]  Continue plan of care  []  Update interventions per flow sheet       []  Discharge due to:_  []  Other:_      Namrata Rainey PTA 10/28/2022  10:25 AM    Future Appointments   Date Time Provider Alisia Benítez   10/31/2022  9:00 AM Yamil Martinez Victorino Potts [facility]   11/4/2022  9:45 AM Ritu Burt, PTA [facility]   11/7/2022  9:00 AM Ritu Burt, PTA [facility]   11/11/2022 12:00 PM Ritu Burt, PTA [facility]   11/14/2022  9:00 AM Ritu Burt, PTA [facility]   11/18/2022  9:00 AM Ritu Burt, PTA [facility]

## 2022-10-31 ENCOUNTER — HOSPITAL ENCOUNTER (OUTPATIENT)
Dept: PHYSICAL THERAPY | Age: 57
Discharge: HOME | End: 2022-10-31
Payer: OTHER GOVERNMENT

## 2022-10-31 PROCEDURE — 97112 NEUROMUSCULAR REEDUCATION: CPT

## 2022-10-31 PROCEDURE — 97014 ELECTRIC STIMULATION THERAPY: CPT

## 2022-10-31 PROCEDURE — 97530 THERAPEUTIC ACTIVITIES: CPT

## 2022-10-31 PROCEDURE — 97140 MANUAL THERAPY 1/> REGIONS: CPT

## 2022-10-31 PROCEDURE — 97110 THERAPEUTIC EXERCISES: CPT

## 2022-10-31 NOTE — PROGRESS NOTES
PT DAILY TREATMENT NOTE     Patient Name: Valentino Chavira  Date:10/31/2022  : 1965  [x]  Patient  Verified  Payor: MICHAEL / Plan: [address] / Product Type:  /    In time: 903 am  Out time: 1010 am  Total Treatment Time (min): 67  Visit #: 3 of 8    Treatment Area: Other low back pain [M54.59]  Sacroiliitis (HCC) [M46.1]    SUBJECTIVE  Pain Level (0-10 scale): 1/10   Any medication changes, allergies to medications, adverse drug reactions, diagnosis change, or new procedure performed?: [x] No    [] Yes (see summary sheet for update)  Subjective functional status/changes:   [] No changes reported  Patient states that he thinks that the manual from Friday really helped. Patient reports that he is just sore in that one spot. OBJECTIVE    Modality rationale: decrease pain and increase tissue extensibility to improve the patients ability to assist with performing ADL's and functional tasks without limitations.     Min Type Additional Details   12 [x] Estim:  [x]Unatt       []IFC  []Premod                        []Other:  []w/ice   [x]w/heat  Position:prone  Location:L/S    [] Estim: []Att    []TENS instruct  []NMES                    []Other:  []w/US   []w/ice   []w/heat  Position:  Location:    []  Traction: [] Cervical       []Lumbar                       [] Prone          []Supine                       []Intermittent   []Continuous Lbs:  [] before manual  [] after manual    []  Ultrasound: []Continuous   [] Pulsed                           []1MHz   []3MHz W/cm2:  Location:    []  Iontophoresis with dexamethasone         Location: [] Take home patch   [] In clinic    []  Ice     []  heat  []  Ice massage  []  Laser   []  Anodyne Position:  Location:    []  Laser with stim  []  Other:  Position:  Location:    []  Vasopneumatic Device    []  Right     []  Left  Pre-treatment girth:  Post-treatment girth:  Measured at (location):  Pressure:       [] lo [] med [] hi   Temperature: [] lo [] med [] hi   [] Skin assessment post-treatment:  []intact []redness- no adverse reaction []redness - adverse reaction:     20 min Therapeutic Exercise:  [] See flow sheet :   Rationale: increase ROM and increase strength to improve the patients overall muscle endurance and activity tolerance for ADL's and functional tasks. 10 min Therapeutic Activity:  []  See flow sheet :   Rationale: increase strength and improve coordination  to improve the patients ability to safely perform dynamic activities and to improve functional performance of  ADL's. 15 min Neuromuscular Re-education:  []  See flow sheet :   Rationale: increase strength, improve coordination, and improve balance  to improve the patients ability to perform activities with good form, stability and proprioception. 10 min Manual Therapy: DTM to left SI joint, QL and paraspinals   The manual therapy interventions were performed at a separate and distinct time from the therapeutic activities interventions. Rationale: decrease pain, increase ROM, increase tissue extensibility, decrease trigger points, and increase postural awareness to assist with reducing SI joint dysfunction and performing ADL's with ease. min Self Care/Home Management:    Rationale: increase strength, improve coordination, and improve balance  to improve the patients ability to perform work related tasks with ease. With   [] TE   [] TA   [] neuro   [] other: Patient Education: [x] Review HEP    [] Progressed/Changed HEP based on:   [] positioning   [] body mechanics   [] transfers   [] heat/ice application    [] other:      Other Objective/Functional Measures:   Left posteriorly rotated SI joint    Added forward and lateral step ups 8\" step     Muscle spasms and TTP present at left QL and SI joint     Pain Level (0-10 scale) post treatment: 0/10     ASSESSMENT/Changes in Function:   Patient is progressing as expected towards goals.  Patient performed exercises, as per flow sheet, to assist with increasing core and low back strength. Patient tolerated today's progressed exercises well with no reports of pain. Patient continues to present with SI joint dysfunction. Muscle spasms and TTP decreased following manual therapy. Will continue to progress exercises as patient is able. Patient will continue to benefit from skilled PT services to modify and progress therapeutic interventions, address functional mobility deficits, address ROM deficits, address strength deficits, analyze and address soft tissue restrictions, analyze and cue movement patterns, analyze and modify body mechanics/ergonomics, assess and modify postural abnormalities, and instruct in home and community integration to attain remaining goals. []  See Plan of Care  []  See progress note/recertification  []  See Discharge Summary         Progress towards goals / Updated goals:  1. Patient will achieve predicted FOTO score of 68 for demonstration of improved function. PN: 57 points. Current: Ongoing, will assess at next PN. 10/31/2022     2. Patient will report ability to walk more than a mile with little to no difficulty to facilitate return to community recreation. PN: Patient reports that he can walk 3 tenths of a mile before having increased difficulty from osteoarthritis in his knees and increased low back pain. 3. Patient will be able to bend lift and carry 50# with good body mechanics and little to no difficulty to facilitate grocery management. PN: Patient is able to bend and lift 50# but experiences increased low back pain.    Current: to assess next visit 10/31/22    PLAN  [x]  Upgrade activities as tolerated     [x]  Continue plan of care  []  Update interventions per flow sheet       []  Discharge due to:_  []  Other:_      Kamaljit Gonzalez PTA 10/31/2022  10:25 AM    Future Appointments   Date Time Provider Alisia Benítez   11/4/2022  9:45 AM [facility]   11/7/2022  9:00 AM Melinda Huston, PTA [facility]   11/11/2022 12:00 PM Melinda Huston, PTA [facility]   11/14/2022  9:00 AM Melinda Huston, PTA [facility]   11/18/2022  9:00 AM Melinda Huston, PTA [facility]

## 2022-11-04 ENCOUNTER — HOSPITAL ENCOUNTER (OUTPATIENT)
Dept: PHYSICAL THERAPY | Age: 57
Discharge: HOME | End: 2022-11-04
Payer: OTHER GOVERNMENT

## 2022-11-04 PROCEDURE — 97530 THERAPEUTIC ACTIVITIES: CPT

## 2022-11-04 PROCEDURE — 97014 ELECTRIC STIMULATION THERAPY: CPT

## 2022-11-04 PROCEDURE — 97110 THERAPEUTIC EXERCISES: CPT

## 2022-11-04 PROCEDURE — 97112 NEUROMUSCULAR REEDUCATION: CPT

## 2022-11-04 PROCEDURE — 97140 MANUAL THERAPY 1/> REGIONS: CPT

## 2022-11-04 NOTE — PROGRESS NOTES
PT DAILY TREATMENT NOTE     Patient Name: Tre Hui  Date:2022  : 1965  [x]  Patient  Verified  Payor: MICHAEL / Plan: [address] / Product Type:  /    In time: 951 am  Out time: 1050 am  Total Treatment Time (min): 61  Visit #: 4 of 8    Treatment Area: Other low back pain [M54.59]  Sacroiliitis (HCC) [M46.1]    SUBJECTIVE  Pain Level (0-10 scale): 0/10   Any medication changes, allergies to medications, adverse drug reactions, diagnosis change, or new procedure performed?: [x] No    [] Yes (see summary sheet for update)  Subjective functional status/changes:   [] No changes reported  Patient reports that his back is feeling better. \"I don't think I had any pain getting in and out of the car the last couple of days. \"     OBJECTIVE    Modality rationale: decrease pain and increase tissue extensibility to improve the patients ability to assist with performing ADL's and functional tasks without limitations.     Min Type Additional Details   10 [x] Estim:  [x]Unatt       []IFC  []Premod                        []Other:  []w/ice   [x]w/heat  Position:prone  Location:L/S    [] Estim: []Att    []TENS instruct  []NMES                    []Other:  []w/US   []w/ice   []w/heat  Position:  Location:    []  Traction: [] Cervical       []Lumbar                       [] Prone          []Supine                       []Intermittent   []Continuous Lbs:  [] before manual  [] after manual    []  Ultrasound: []Continuous   [] Pulsed                           []1MHz   []3MHz W/cm2:  Location:    []  Iontophoresis with dexamethasone         Location: [] Take home patch   [] In clinic    []  Ice     []  heat  []  Ice massage  []  Laser   []  Anodyne Position:  Location:    []  Laser with stim  []  Other:  Position:  Location:    []  Vasopneumatic Device    []  Right     []  Left  Pre-treatment girth:  Post-treatment girth:  Measured at (location):  Pressure:       [] lo [] med [] hi Temperature: [] lo [] med [] hi   [] Skin assessment post-treatment:  []intact []redness- no adverse reaction []redness - adverse reaction:     20 min Therapeutic Exercise:  [] See flow sheet :   Rationale: increase ROM and increase strength to improve the patients overall muscle endurance and activity tolerance for ADL's and functional tasks. 11 min Therapeutic Activity:  []  See flow sheet :   Rationale: increase strength and improve coordination  to improve the patients ability to safely perform dynamic activities and to improve functional performance of  ADL's. 10 min Neuromuscular Re-education:  []  See flow sheet :   Rationale: increase strength, improve coordination, and improve balance  to improve the patients ability to perform activities with good form, stability and proprioception. 10 min Manual Therapy: DTM to left SI joint, QL and paraspinals   The manual therapy interventions were performed at a separate and distinct time from the therapeutic activities interventions. Rationale: decrease pain, increase ROM, increase tissue extensibility, decrease trigger points, and increase postural awareness to assist with reducing SI joint dysfunction and performing ADL's with ease. min Self Care/Home Management:    Rationale: increase strength, improve coordination, and improve balance  to improve the patients ability to perform work related tasks with ease. With   [] TE   [] TA   [] neuro   [] other: Patient Education: [x] Review HEP    [] Progressed/Changed HEP based on:   [] positioning   [] body mechanics   [] transfers   [] heat/ice application    [] other:      Other Objective/Functional Measures:  Symmetrical SI joint    Added 25# box lift    Progresses weight of shoulder extension at [place]    Muscle spasms and TTP present at left QL and SI joint     Pain Level (0-10 scale) post treatment: 0/10     ASSESSMENT/Changes in Function:   Patient is progressing well towards goals. Patient performed exercises, as per flow sheet, to assist with increasing core and low back strength. Patient tolerated today's progressed exercise with no reports of increased pain. Patient presented with improved SI joint today. Patient tolerated today's overall session well, as evident by, no increase in pain and improved exercise tolerance. Patient will continue to benefit from skilled PT services to modify and progress therapeutic interventions, address functional mobility deficits, address ROM deficits, address strength deficits, analyze and address soft tissue restrictions, analyze and cue movement patterns, analyze and modify body mechanics/ergonomics, assess and modify postural abnormalities, and instruct in home and community integration to attain remaining goals. []  See Plan of Care  []  See progress note/recertification  []  See Discharge Summary         Progress towards goals / Updated goals:  1. Patient will achieve predicted FOTO score of 68 for demonstration of improved function. PN: 57 points. Current: Ongoing, will assess at next PN. 11/4/2022     2. Patient will report ability to walk more than a mile with little to no difficulty to facilitate return to community recreation. PN: Patient reports that he can walk 3 tenths of a mile before having increased difficulty from osteoarthritis in his knees and increased low back pain. Current: Patient was able to tolerate . 25 miles on the treadmill with no increase in low back pain. 11/4/2022    3. Patient will be able to bend lift and carry 50# with good body mechanics and little to no difficulty to facilitate grocery management. PN: Patient is able to bend and lift 50# but experiences increased low back pain. Current: Patient was able tolerate 25# box lift with no increase in low back pain.  11/4/2022    PLAN  [x]  Upgrade activities as tolerated     [x]  Continue plan of care  []  Update interventions per flow sheet       []  Discharge due to:_  []  Other:_      Kellie Pierre, WOO 11/4/2022  10:25 AM    Future Appointments   Date Time Provider Alisia Benítez   11/7/2022  9:00 AM Savanna Partida, WOO [facility]   11/11/2022 12:00 PM Savanna Partida, WOO [facility]   11/14/2022  9:00 AM Savanna Partida PTA [facility]   11/18/2022  9:00 AM Savanna Partida, WOO [facility]

## 2022-11-07 ENCOUNTER — APPOINTMENT (OUTPATIENT)
Dept: PHYSICAL THERAPY | Age: 57
End: 2022-11-07
Payer: OTHER GOVERNMENT

## 2022-11-08 ENCOUNTER — TELEPHONE (OUTPATIENT)
Dept: PHYSICAL THERAPY | Age: 57
End: 2022-11-08

## 2022-11-11 ENCOUNTER — APPOINTMENT (OUTPATIENT)
Dept: PHYSICAL THERAPY | Age: 57
End: 2022-11-11
Payer: OTHER GOVERNMENT

## 2022-11-14 ENCOUNTER — APPOINTMENT (OUTPATIENT)
Dept: PHYSICAL THERAPY | Age: 57
End: 2022-11-14
Payer: OTHER GOVERNMENT

## 2022-11-18 ENCOUNTER — HOSPITAL ENCOUNTER (OUTPATIENT)
Dept: PHYSICAL THERAPY | Age: 57
Discharge: HOME | End: 2022-11-18
Payer: OTHER GOVERNMENT

## 2022-11-18 PROCEDURE — 97110 THERAPEUTIC EXERCISES: CPT

## 2022-11-18 PROCEDURE — 97530 THERAPEUTIC ACTIVITIES: CPT

## 2022-11-18 PROCEDURE — 97140 MANUAL THERAPY 1/> REGIONS: CPT

## 2022-11-18 NOTE — PROGRESS NOTES
[facility] [address], [facility], [facility] [address],[place]  [phone] fax    Physician Update  [x] Progress Note  [] Discharge Summary  Patient name: Angely Wing Start of Care: 2022   Referral source: May Asher NP : 1965                Medical Diagnosis: Other low back pain [M54.59]  Sacroiliitis ([address] [M46.1]  Payor:  / Plan: [address] / Product Type:  /  Onset Date:22                Treatment Diagnosis: Left low back, hip and knee pain   Prior Hospitalization: see medical history Provider#: [id number]   Medications: Verified on Patient summary List    Comorbidities: Patient reports:    Prior Level of Function: I ADLs and 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 Visits from Start of Care: 12   Missed Visits: 2    Status at Evaluation/Last Progress Note:   Patient has attended 7 PT visits and is progressing well. Patient's overall low back pain has improved along with his FOTO assessment score. Patient refers to his low back pain as situational and occurs when bending over and when getting in and out of the car. Patient continues to present to PT with decreased core strength and SI joint dysfunction. Patient reports 30% improvement with PT and would like to continue in order to return to PLOF. Patient will continue to benefit from skilled PT services to modify and progress therapeutic interventions, address functional mobility deficits, address ROM deficits, address strength deficits, analyze and address soft tissue restrictions, analyze and cue movement patterns, analyze and modify body mechanics/ergonomics, assess and modify postural abnormalities, and instruct in home and community integration to attain remaining goals.     Progress towards Goals:     Functional Gains: Level pain, getting in and out of car, walking   Functional Deficits: Standing from a seated position  % improvement: 50%   Pain   Average: 1/10                  Best: 1-2/10                Worst: 9/10  Patient Goal: \"To be 100% pain free. \"    1. Patient will achieve predicted FOTO score of 68 for demonstration of improved function. PN: 57 points  Current: 67 points. Progressing     2. Patient will report ability to walk more than a mile with little to no difficulty to facilitate return to community recreation. PN: Patient reports that he can walk 3 tenths of a mile before having increased difficulty from osteoarthritis in his knees and increased low back pain. Current: Patient was able to tolerate walking a mile with no reports of increased low back pain. MET      3. Patient will be able to bend lift and carry 50# with good body mechanics and little to no difficulty to facilitate grocery management. PN: Patient is able to bend and lift 50# but experiences increased low back pain. Current: Patient able to perform 50# lift and carry with no increase in low back pain. MET      Goals: to be achieved in 4 weeks:  1. Patient will achieve predicted FOTO score of 68 for demonstration of improved function. PN: 67 points. 2. Patient will be able to report 1-2/10 pain when rising up from a seated position in order to improve tolerance for job related tasks. PN: Patient reports 8-9/10 pain when getting up from the seated position. ASSESSMENT/RECOMMENDATIONS:  Patient has attended 12 PT visits and is progressing well. Patient's FOTO assessment score, walking tolerance, overall low back pain and car transfers have improved since the start of PT. Patient still continues to have SI joint dysfunction, intermittent low back pain with car transfers and when arising from the seated position. Patient reports 50% improvement with PT and would like continue in order to further assist with decreasing low back pain.  Patient will continue to benefit from skilled PT services to modify and progress therapeutic interventions, address functional mobility deficits, address ROM deficits, address strength deficits, analyze and address soft tissue restrictions, analyze and cue movement patterns, analyze and modify body mechanics/ergonomics, assess and modify postural abnormalities, and instruct in home and community integration to attain remaining goals. [x]Continue therapy per initial plan/protocol at a frequency of  2 x per week for 4 weeks  []Continue therapy with the following recommended changes:_____________________      _____________________________________________________________________  []Discontinue therapy progressing towards or have reached established goals  []Discontinue therapy due to lack of appreciable progress towards goals  []Discontinue therapy due to lack of attendance or compliance  []Await Physician's recommendations/decisions regarding therapy  []Other:________________________________________________________________    Thank you for this referral. Clive Law PTA 11/18/2022 11:13 AM  NOTE TO PHYSICIAN:  [address] AND   FAX TO [facility]: [phone]  If you are unable to process this request in 24 hours please contact our office: [phone]    I have read the above report and request that my patient continue as recommended. I have read the above report and request that my patient continue therapy with the following changes/special instructions:_____________________________________  I have read the above report and request that my patient be discharged from therapy.     [de-identified] Signature:____________Date:_________TIME:________     Celia Cooper, NP  ** Signature, Date and Time must be completed for valid certification **

## 2022-11-18 NOTE — PROGRESS NOTES
PT DAILY TREATMENT NOTE     Patient Name: Valentino Chavira  Date:2022  : 1965  [x]  Patient  Verified  Payor: MICHAEL / Plan: [address] / Product Type:  /    In time: 903 am  Out time: 1000 am  Total Treatment Time (min): 57  Visit #: 5 of 8    Treatment Area: Other low back pain [M54.59]  Sacroiliitis (HCC) [M46.1]    SUBJECTIVE  Pain Level (0-10 scale): 0/10   Any medication changes, allergies to medications, adverse drug reactions, diagnosis change, or new procedure performed?: [x] No    [] Yes (see summary sheet for update)  Subjective functional status/changes:   [] No changes reported  Patient reports feels better overall. Patient states that he has done anything the last week and a half due to being sick. OBJECTIVE    Modality rationale: Patient declined modalities today.      Min Type Additional Details    [] Estim:  []Unatt       []IFC  []Premod                        []Other:  []w/ice   []w/heat  Position:  Location:    [] Estim: []Att    []TENS instruct  []NMES                    []Other:  []w/US   []w/ice   []w/heat  Position:  Location:    []  Traction: [] Cervical       []Lumbar                       [] Prone          []Supine                       []Intermittent   []Continuous Lbs:  [] before manual  [] after manual    []  Ultrasound: []Continuous   [] Pulsed                           []1MHz   []3MHz W/cm2:  Location:    []  Iontophoresis with dexamethasone         Location: [] Take home patch   [] In clinic    []  Ice     []  heat  []  Ice massage  []  Laser   []  Anodyne Position:  Location:    []  Laser with stim  []  Other:  Position:  Location:    []  Vasopneumatic Device    []  Right     []  Left  Pre-treatment girth:  Post-treatment girth:  Measured at (location):  Pressure:       [] lo [] med [] hi   Temperature: [] lo [] med [] hi   [] Skin assessment post-treatment:  []intact []redness- no adverse reaction []redness - adverse reaction:     25 min Therapeutic Exercise:  [] See flow sheet :   Rationale: increase ROM and increase strength to improve the patients overall muscle endurance and activity tolerance for ADL's and functional tasks. 22 min Therapeutic Activity:  []  See flow sheet :   Rationale: increase strength and improve coordination  to improve the patients ability to safely perform dynamic activities and to improve functional performance of  ADL's.      min Neuromuscular Re-education:  []  See flow sheet :   Rationale: increase strength, improve coordination, and improve balance  to improve the patients ability to perform activities with good form, stability and proprioception. 10 min Manual Therapy: Shot gun mobs/MET; DTM and stretching (B) piriformis and glute med    The manual therapy interventions were performed at a separate and distinct time from the therapeutic activities interventions. Rationale: decrease pain, increase ROM, increase tissue extensibility, decrease trigger points, and increase postural awareness to assist with reducing SI joint dysfunction and performing ADL's with ease. min Self Care/Home Management:    Rationale: increase strength, improve coordination, and improve balance  to improve the patients ability to perform work related tasks with ease. With   [] TE   [] TA   [] neuro   [] other: Patient Education: [x] Review HEP    [] Progressed/Changed HEP based on:   [] positioning   [] body mechanics   [] transfers   [] heat/ice application    [] other:      Other Objective/Functional Measures:  Left posteriorly rotated SI joint    Functional Gains: Level pain, getting in and out of car, walking   Functional Deficits: Standing from a seated position  % improvement: 50%   Pain   Average: 1/10       Best: 1-2/10     Worst: 9/10  Patient Goal: \"To be 100% pain free. \"     Pain Level (0-10 scale) post treatment: 0/10     ASSESSMENT/Changes in Function:   Patient has attended 12 PT visits and is progressing well. Patient's FOTO assessment score, walking tolerance, overall low back pain and car transfers have improved since the start of PT. Patient still continues to have SI joint dysfunction, intermittent low back pain with car transfers and when arising from the seated position. Patient reports 50% improvement with PT and would like continue in order to further assist with decreasing low back pain. Patient will continue to benefit from skilled PT services to modify and progress therapeutic interventions, address functional mobility deficits, address ROM deficits, address strength deficits, analyze and address soft tissue restrictions, analyze and cue movement patterns, analyze and modify body mechanics/ergonomics, assess and modify postural abnormalities, and instruct in home and community integration to attain remaining goals. []  See Plan of Care  [x]  See progress note/recertification  []  See Discharge Summary         Progress towards goals / Updated goals:  1. Patient will achieve predicted FOTO score of 68 for demonstration of improved function. PN: 57 points  Current: 67 points. Progressing     2. Patient will report ability to walk more than a mile with little to no difficulty to facilitate return to community recreation. PN: Patient reports that he can walk 3 tenths of a mile before having increased difficulty from osteoarthritis in his knees and increased low back pain. Current: Patient was able to tolerate walking a mile with no reports of increased low back pain. MET     3. Patient will be able to bend lift and carry 50# with good body mechanics and little to no difficulty to facilitate grocery management. PN: Patient is able to bend and lift 50# but experiences increased low back pain. Current: Patient able to perform 50# lift and carry with no increase in low back pain.  MET     PLAN  [x]  Upgrade activities as tolerated     [x]  Continue plan of care  []  Update interventions per flow sheet       []  Discharge due to:_  []  Other:_      Royce Cooks, PTA 11/18/2022  10:25 AM    Future Appointments   Date Time Provider Alisia Benítez   11/28/2022  4:30 PM Jeanie Rosa, WOO [facility]   12/2/2022 12:00 PM Jeanie Rosa, WOO [facility]   12/5/2022 10:30 AM Jeanie Rosa, WOO [facility]

## 2022-11-28 ENCOUNTER — HOSPITAL ENCOUNTER (OUTPATIENT)
Dept: PHYSICAL THERAPY | Age: 57
Discharge: HOME | End: 2022-11-28
Payer: OTHER GOVERNMENT

## 2022-11-28 PROCEDURE — 97110 THERAPEUTIC EXERCISES: CPT

## 2022-11-28 PROCEDURE — 97140 MANUAL THERAPY 1/> REGIONS: CPT

## 2022-11-28 PROCEDURE — 97530 THERAPEUTIC ACTIVITIES: CPT

## 2022-11-28 NOTE — PROGRESS NOTES
PT DAILY TREATMENT NOTE     Patient Name: Memo Block  Date:2022  : 1965  [x]  Patient  Verified  Payor: MICHAEL / Plan: [address] / Product Type:  /    In time: 436 pm  Out time:522 pm   Total Treatment Time (min): 46  Visit #: 1 of 8    Treatment Area: Other low back pain [M54.59]  Sacroiliitis (HCC) [M46.1]    SUBJECTIVE  Pain Level (0-10 scale): 0/10   Any medication changes, allergies to medications, adverse drug reactions, diagnosis change, or new procedure performed?: [x] No    [] Yes (see summary sheet for update)  Subjective functional status/changes:   [] No changes reported  Patient reports that yesterday was the first time he had low back pain since the last time he came to PT.      OBJECTIVE    Modality rationale: Patient declined modalities today.      Min Type Additional Details    [] Estim:  []Unatt       []IFC  []Premod                        []Other:  []w/ice   []w/heat  Position:  Location:    [] Estim: []Att    []TENS instruct  []NMES                    []Other:  []w/US   []w/ice   []w/heat  Position:  Location:    []  Traction: [] Cervical       []Lumbar                       [] Prone          []Supine                       []Intermittent   []Continuous Lbs:  [] before manual  [] after manual    []  Ultrasound: []Continuous   [] Pulsed                           []1MHz   []3MHz W/cm2:  Location:    []  Iontophoresis with dexamethasone         Location: [] Take home patch   [] In clinic    []  Ice     []  heat  []  Ice massage  []  Laser   []  Anodyne Position:  Location:    []  Laser with stim  []  Other:  Position:  Location:    []  Vasopneumatic Device    []  Right     []  Left  Pre-treatment girth:  Post-treatment girth:  Measured at (location):  Pressure:       [] lo [] med [] hi   Temperature: [] lo [] med [] hi   [] Skin assessment post-treatment:  []intact []redness- no adverse reaction []redness - adverse reaction:     22 min Therapeutic Exercise:  [] See flow sheet :   Rationale: increase ROM and increase strength to improve the patients overall muscle endurance and activity tolerance for ADL's and functional tasks. 14 min Therapeutic Activity:  []  See flow sheet :   Rationale: increase strength and improve coordination  to improve the patients ability to safely perform dynamic activities and to improve functional performance of  ADL's.      min Neuromuscular Re-education:  []  See flow sheet :   Rationale: increase strength, improve coordination, and improve balance  to improve the patients ability to perform activities with good form, stability and proprioception. 10 min Manual Therapy: Shot gun mobs/MET; DTM and stretching (B) piriformis and glute med    The manual therapy interventions were performed at a separate and distinct time from the therapeutic activities interventions. Rationale: decrease pain, increase ROM, increase tissue extensibility, decrease trigger points, and increase postural awareness to assist with reducing SI joint dysfunction and performing ADL's with ease. min Self Care/Home Management:    Rationale: increase strength, improve coordination, and improve balance  to improve the patients ability to perform work related tasks with ease. With   [] TE   [] TA   [] neuro   [] other: Patient Education: [x] Review HEP    [] Progressed/Changed HEP based on:   [] positioning   [] body mechanics   [] transfers   [] heat/ice application    [] other:      Other Objective/Functional Measures:  Progressed hip 3 way to 2#, SL and DL weight     Symmetrical SI joint    Pain Level (0-10 scale) post treatment: 0/10     ASSESSMENT/Changes in Function:   Patient is progressing as expected towards goals. Progressed exercises, as per flow sheet, to assist with improving (B) knee strength. Patient experienced (B) knee pain with lateral stepping with GTB.  Attempted to perform 20# KB lift from 6\" box but patient was unable to tolerate due to increased (B) knee pain. Patient tolerated all other exercises well. SI joint dysfunction continues to improve. Patient responded well to today's session, as evident by, no increase in low back pain and improved exercise tolerance. Patient will continue to benefit from skilled PT services to modify and progress therapeutic interventions, address functional mobility deficits, address ROM deficits, address strength deficits, analyze and address soft tissue restrictions, analyze and cue movement patterns, analyze and modify body mechanics/ergonomics, assess and modify postural abnormalities, and instruct in home and community integration to attain remaining goals. []  See Plan of Care  []  See progress note/recertification  []  See Discharge Summary         Progress towards goals / Updated goals:  1. Patient will achieve predicted FOTO score of 68 for demonstration of improved function. PN: 67 points. Current: Ongoing, Will assess at next progress note. 11/28/2022    2. Patient will be able to report 1-2/10 pain when rising up from a seated position in order to improve tolerance for job related tasks. PN: Patient reports 8-9/10 pain when getting up from the seated position. Current: Patient reported increased low back pain when getting out of the recliner yesterday.  11/28/2002     PLAN  [x]  Upgrade activities as tolerated     [x]  Continue plan of care  []  Update interventions per flow sheet       []  Discharge due to:_  []  Other:_      Bal Da Silva PTA 11/28/2022  10:25 AM    Future Appointments   Date Time Provider Alisia Benítez   11/28/2022  4:30 PM [facility]   12/2/2022 12:00 PM Van Macedo [facility]   12/5/2022 10:30 AM Demetrice Grey DPT [facility]

## 2022-12-02 ENCOUNTER — HOSPITAL ENCOUNTER (OUTPATIENT)
Dept: PHYSICAL THERAPY | Age: 57
Discharge: HOME | End: 2022-12-02
Payer: OTHER GOVERNMENT

## 2022-12-02 PROCEDURE — 97112 NEUROMUSCULAR REEDUCATION: CPT

## 2022-12-02 PROCEDURE — 97140 MANUAL THERAPY 1/> REGIONS: CPT

## 2022-12-02 PROCEDURE — 97110 THERAPEUTIC EXERCISES: CPT

## 2022-12-02 NOTE — PROGRESS NOTES
PT DAILY TREATMENT NOTE     Patient Name: Alma aGrces  Date:2022  : 1965  [x]  Patient  Verified  Payor: MICHAEL / Plan: [employer]p [address] / Product Type:  /    In time:1200  Out time:1244  Total Treatment Time (min): 44  Visit #: 2 of 8    Treatment Area: Other low back pain [M54.59]  Sacroiliitis (HCC) [M46.1]    SUBJECTIVE  Pain Level (0-10 scale): 0/10  Any medication changes, allergies to medications, adverse drug reactions, diagnosis change, or new procedure performed?: [x] No    [] Yes (see summary sheet for update)  Subjective functional status/changes:   [] No changes reported  Pt reports no hip or back pain today. OBJECTIVE    20 min Therapeutic Exercise:  [x] See flow sheet :   Rationale: increase ROM and increase strength to improve the patients ability to perform ADL's     14 min Neuromuscular Re-education:  [x]  See flow sheet : quad, hamstring, glut, core re-ed   Rationale: increase ROM and increase strength  to improve the patients ability to perform ADL's pain free    10 min Manual Therapy:  Manual Therapy: Shot gun mobs/MET; DTM and stretching (B) piriformis and glute med    The manual therapy interventions were performed at a separate and distinct time from the therapeutic activities interventions. Rationale: decrease pain and increase ROM to perform ADL's pain free          With   [] TE   [] TA   [] neuro   [] other: Patient Education: [x] Review HEP    [] Progressed/Changed HEP based on:   [] positioning   [] body mechanics   [] transfers   [] heat/ice application    [] other:      Other Objective/Functional Measures:   No pelvic obliquity noted this visit     Pain Level (0-10 scale) post treatment: 0/10    ASSESSMENT/Changes in Function: Pt making steady progress towards goals. Pt tolerated treatment session well without increased pain or discomfort. No pelvic obliquity noted this visit. He continues to report B knee pain with exercises.  Will continue to progress as tolerated to address remaining deficits. Patient will continue to benefit from skilled PT services to modify and progress therapeutic interventions, address functional mobility deficits, address ROM deficits, address strength deficits, analyze and address soft tissue restrictions, analyze and cue movement patterns, and analyze and modify body mechanics/ergonomics to attain remaining goals. []  See Plan of Care  []  See progress note/recertification  []  See Discharge Summary         Progress towards goals / Updated goals:  1. Patient will achieve predicted FOTO score of 68 for demonstration of improved function. PN: 67 points. Current: Ongoing, Will assess at next progress note. 11/28/2022     2. Patient will be able to report 1-2/10 pain when rising up from a seated position in order to improve tolerance for job related tasks. PN: Patient reports 8-9/10 pain when getting up from the seated position. Current: Patient reported increased low back pain when getting out of the recliner yesterday.  11/28/2002     PLAN  []  Upgrade activities as tolerated     [x]  Continue plan of care  []  Update interventions per flow sheet       []  Discharge due to:_  []  Other:_      Esperanza Phalen, PTA 12/2/2022  12:01 PM    Future Appointments   Date Time Provider Alisia Benítez   12/5/2022 10:30 AM [facility]

## 2022-12-05 ENCOUNTER — HOSPITAL ENCOUNTER (OUTPATIENT)
Dept: PHYSICAL THERAPY | Age: 57
Discharge: HOME | End: 2022-12-05
Payer: OTHER GOVERNMENT

## 2022-12-05 PROCEDURE — 97110 THERAPEUTIC EXERCISES: CPT

## 2022-12-05 PROCEDURE — 97140 MANUAL THERAPY 1/> REGIONS: CPT

## 2022-12-05 PROCEDURE — 97112 NEUROMUSCULAR REEDUCATION: CPT

## 2022-12-05 NOTE — PROGRESS NOTES
PT DAILY TREATMENT NOTE     Patient Name: Ever Solano  Date:2022  : 1965  [x]  Patient  Verified  Payor: MICHAEL / Plan: [address]p [address] / Product Type:  /    In time:1030  Out time:1115  Total Treatment Time (min): 45  Visit #: 3 of 8    Treatment Area: Other low back pain [M54.59]  Sacroiliitis (HCC) [M46.1]    SUBJECTIVE  Pain Level (0-10 scale): 1  Any medication changes, allergies to medications, adverse drug reactions, diagnosis change, or new procedure performed?: [x] No    [] Yes (see summary sheet for update)  Subjective functional status/changes:   [] No changes reported  Pt noted     OBJECTIVE     20 min Therapeutic Exercise:  [x] See flow sheet :   Rationale: increase ROM and increase strength to improve the patients ability to perform ADL's     15 min Neuromuscular Re-education:  [x]  See flow sheet : quad, hamstring, glut, core re-ed   Rationale: increase ROM and increase strength  to improve the patients ability to perform ADL's pain free     10 min Manual Therapy:  Manual Therapy: Shot gun mobs/MET; DTM and stretching (B) piriformis and glute med    The manual therapy interventions were performed at a separate and distinct time from the therapeutic activities interventions. Rationale: decrease pain and increase ROM to perform ADL's pain free          With   [] TE   [] TA   [] neuro   [] other: Patient Education: [x] Review HEP    [] Progressed/Changed HEP based on:   [] positioning   [] body mechanics   [] transfers   [] heat/ice application    [] other:      Other Objective/Functional Measures:   - increased reps per flow sheet     Pain Level (0-10 scale) post treatment: 0    ASSESSMENT/Changes in Function: increased reps and weight per flow sheet with good tolerance and no increase in pain.     Patient will continue to benefit from skilled PT services to modify and progress therapeutic interventions, address functional mobility deficits, address ROM deficits, address strength deficits, analyze and address soft tissue restrictions, and analyze and cue movement patterns to attain remaining goals. [x]  See Plan of Care  []  See progress note/recertification  []  See Discharge Summary         Progress towards goals / Updated goals:  1. Patient will achieve predicted FOTO score of 68 for demonstration of improved function. PN: 67 points. Current: Ongoing, Will assess at next progress note. 11/28/2022     2. Patient will be able to report 1-2/10 pain when rising up from a seated position in order to improve tolerance for job related tasks. PN: Patient reports 8-9/10 pain when getting up from the seated position. Current: Patient reported increased low back pain when getting out of the recliner yesterday.  11/28/2002        PLAN  []  Upgrade activities as tolerated     []  Continue plan of care  []  Update interventions per flow sheet       []  Discharge due to:_  []  Other:_      Paula Webster PTA 12/5/2022  9:50 AM    Future Appointments   Date Time Provider Alisia Benítez   12/5/2022 10:30 AM [facility]on [facility]

## 2022-12-09 ENCOUNTER — TELEPHONE (OUTPATIENT)
Dept: PHYSICAL THERAPY | Age: 57
End: 2022-12-09

## 2023-01-09 NOTE — PROGRESS NOTES
[facility] [address] [id number] [facility], [address] [place] [address], [facility] [address] [phone] fax    Discharge Summary    Patient name: Micheal Isaac Start of Care: 2022   Referral source: Margo Young NP : 1965                Medical Diagnosis: Other low back pain [M54.59]  Sacroiliitis ([address] [M46.1]  Payor:  / Plan: [address]p [address] / Product Type:  /  Onset Date:22                Treatment Diagnosis: Left low back, hip and knee pain   Prior Hospitalization: see medical history Provider#: [id number]   Medications: Verified on Patient summary List    Comorbidities: Patient reports:    Prior Level of Function: I ADLs and 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 Visits from Start of Care:15  Missed Visits: 2  Reporting Period : 2022 to 2022      Summary of Care:  Goal:Patient will achieve predicted FOTO score of 68 for demonstration of improved function. Status at last note/certification:67 points  Status at discharge: not met    Goal:Patient will be able to report 1-2/10 pain when rising up from a seated position in order to improve tolerance for job related tasks. Status at last note/certification:Patient reports 8-9/10 pain when getting up from the seated position  Status at discharge: not met      ASSESSMENT/RECOMMENDATIONS:  Patient attended all authorized PT visits on . Patient failed to schedule additional PT visits after new authorization. Patient to be discharged at this time. []Discontinue therapy progressing towards or have reached established goals  []Discontinue therapy due to lack of appreciable progress towards goals  [x]Discontinue therapy due to lack of attendance or compliance  []Other:     Thank you for this referral.     Juan Alcantara, PTA 2023 12:30 PM